# Patient Record
(demographics unavailable — no encounter records)

---

## 2024-12-09 NOTE — HEALTH RISK ASSESSMENT
[Yes] : Yes [4 or more  times a week (4 pts)] : 4 or more  times a week (4 points) [1 or 2 (0 pts)] : 1 or 2 (0 points) [Never (0 pts)] : Never (0 points) [No] : In the past 12 months have you used drugs other than those required for medical reasons? No [One fall no injury in past year] : Patient reported one fall in the past year without injury [0] : 2) Feeling down, depressed, or hopeless: Not at all (0) [PHQ-2 Negative - No further assessment needed] : PHQ-2 Negative - No further assessment needed [Never] : Never [Very Good] : ~his/her~  mood as very good [HIV test declined] : HIV test declined [Hepatitis C test declined] : Hepatitis C test declined [None] : None [Alone] : lives alone [Retired] : retired [Graduate School] : graduate school [Single] : single [# Of Children ___] : has [unfilled] children [Feels Safe at Home] : Feels safe at home [Fully functional (bathing, dressing, toileting, transferring, walking, feeding)] : Fully functional (bathing, dressing, toileting, transferring, walking, feeding) [Fully functional (using the telephone, shopping, preparing meals, housekeeping, doing laundry, using] : Fully functional and needs no help or supervision to perform IADLs (using the telephone, shopping, preparing meals, housekeeping, doing laundry, using transportation, managing medications and managing finances) [Reports changes in vision] : Reports changes in vision [Reports normal functional visual acuity (ie: able to read med bottle)] : Reports normal functional visual acuity [Smoke Detector] : smoke detector [Seat Belt] :  uses seat belt [Reviewed no changes] : Reviewed, no changes [Designated Healthcare Proxy] : Designated healthcare proxy [Name: ___] : Health Care Proxy's Name: [unfilled]  [I will adhere to the patient's wishes.] : I will adhere to the patient's wishes. [Excellent] : ~his/her~ current health as excellent [NO] : No [FreeTextEntry1] : Visual issues- cataract surgery [de-identified] : none [de-identified] : EYe doctor [de-identified] : 2 glasses wine nightly [Audit-CScore] : 4 [de-identified] : gym and walking- - 2x weekly gym [de-identified] : vegetarian [de-identified] : Feb 2024 ago lost balance- fell no injury [facility] [[id number]] : 0 [Change in mental status noted] : No change in mental status noted [Language] : denies difficulty with language [Behavior] : denies difficulty with behavior [Learning/Retaining New Information] : denies difficulty learning/retaining new information [Handling Complex Tasks] : denies difficulty handling complex tasks [Reasoning] : denies difficulty with reasoning [Spatial Ability and Orientation] : denies difficulty with spatial ability and orientation [Sexually Active] : not sexually active [High Risk Behavior] : no high risk behavior [Reports changes in hearing] : Reports no changes in hearing [Reports changes in dental health] : Reports no changes in dental health [de-identified] : Doctorate Educational Administration [facility] [de-identified] : glasses- eye MD this year Dr Romero- upcoming cataracts surgery [de-identified] : No issues zacarias [AdvancecareDate] : 12/24

## 2024-12-09 NOTE — REASON FOR VISIT
[Annual Wellness Visit] : an annual wellness visit [FreeTextEntry1] : and also a preop eval for cataract surgery bilaterally Dr Romero Jan 7th and 21st, and for f/u hP

## 2024-12-09 NOTE — REVIEW OF SYSTEMS
[Patient Intake Form Reviewed] : Patient intake form was reviewed [FreeTextEntry9] : +s/p left hip replacement 1/24 [Vision Problems] : vision problems [Negative] : Musculoskeletal [FreeTextEntry3] : cataracts see HPI

## 2024-12-09 NOTE — PHYSICAL EXAM
[Pedal Pulses Present] : the pedal pulses are present [No Palpable Aorta] : no palpable aorta [No Extremity Clubbing/Cyanosis] : no extremity clubbing/cyanosis [Normal Appearance] : normal in appearance [No Masses] : no palpable masses [No Nipple Discharge] : no nipple discharge [No Axillary Lymphadenopathy] : no axillary lymphadenopathy [Normal Axillary Nodes] : no axillary lymphadenopathy [Normal Inguinal Nodes] : no inguinal lymphadenopathy [Normal] : normal gait, coordination grossly intact, no focal deficits and deep tendon reflexes were 2+ and symmetric [de-identified] : pitting and mostly nonpitting edeam to ankles bilaterally

## 2024-12-09 NOTE — HEALTH RISK ASSESSMENT
[Yes] : Yes [4 or more  times a week (4 pts)] : 4 or more  times a week (4 points) [1 or 2 (0 pts)] : 1 or 2 (0 points) [Never (0 pts)] : Never (0 points) [No] : In the past 12 months have you used drugs other than those required for medical reasons? No [One fall no injury in past year] : Patient reported one fall in the past year without injury [0] : 2) Feeling down, depressed, or hopeless: Not at all (0) [PHQ-2 Negative - No further assessment needed] : PHQ-2 Negative - No further assessment needed [Never] : Never [Very Good] : ~his/her~  mood as very good [HIV test declined] : HIV test declined [Hepatitis C test declined] : Hepatitis C test declined [None] : None [Alone] : lives alone [Retired] : retired [Graduate School] : graduate school [Single] : single [# Of Children ___] : has [unfilled] children [Feels Safe at Home] : Feels safe at home [Fully functional (bathing, dressing, toileting, transferring, walking, feeding)] : Fully functional (bathing, dressing, toileting, transferring, walking, feeding) [Fully functional (using the telephone, shopping, preparing meals, housekeeping, doing laundry, using] : Fully functional and needs no help or supervision to perform IADLs (using the telephone, shopping, preparing meals, housekeeping, doing laundry, using transportation, managing medications and managing finances) [Reports changes in vision] : Reports changes in vision [Reports normal functional visual acuity (ie: able to read med bottle)] : Reports normal functional visual acuity [Smoke Detector] : smoke detector [Seat Belt] :  uses seat belt [Reviewed no changes] : Reviewed, no changes [Designated Healthcare Proxy] : Designated healthcare proxy [Name: ___] : Health Care Proxy's Name: [unfilled]  [I will adhere to the patient's wishes.] : I will adhere to the patient's wishes. [Excellent] : ~his/her~ current health as excellent [NO] : No [FreeTextEntry1] : Visual issues- cataract surgery [de-identified] : none [de-identified] : EYe doctor [de-identified] : 2 glasses wine nightly [Audit-CScore] : 4 [de-identified] : gym and walking- - 2x weekly gym [de-identified] : vegetarian [de-identified] : Feb 2024 ago lost balance- fell no injury [facility] [[id number]] : 0 [Change in mental status noted] : No change in mental status noted [Language] : denies difficulty with language [Behavior] : denies difficulty with behavior [Learning/Retaining New Information] : denies difficulty learning/retaining new information [Handling Complex Tasks] : denies difficulty handling complex tasks [Reasoning] : denies difficulty with reasoning [Spatial Ability and Orientation] : denies difficulty with spatial ability and orientation [Sexually Active] : not sexually active [High Risk Behavior] : no high risk behavior [Reports changes in hearing] : Reports no changes in hearing [Reports changes in dental health] : Reports no changes in dental health [de-identified] : Doctorate Educational Administration [facility] [de-identified] : glasses- eye MD this year Dr Romero- upcoming cataracts surgery [de-identified] : No issues zacarias [AdvancecareDate] : 12/24

## 2024-12-09 NOTE — ASSESSMENT
[FreeTextEntry1] : 1. HBP- goal BP less than 130/80. - MET-  currently on losartan 100 mg daily, amlodipine 5 mg daily, chlorthalidone 25 mg daily - continue   2. Hyperlipidemia- LDL last Aug 2020 98 mod intensity statin- Cardiac Calcium scoring= 0 AU 8/20   3. h/o IFG- Continue to monitor with TLC enc. - No known fam hx of DM, but has other risks (Obesity/HBP, hyperlipidemia). Last  7/20- TLC encouraged- no sx. Issue of metformin discussed in past- declined- no sx of hyperglycemia   4 HCM- immun - reviewed- flu shot this season  mammo March 2024- neg  colon 1/6/17- negative- and neg 12/06- but 3mm tubular adenoma 1/9/02)- will repeat by aged 75  DEXA at age 65 - 8/16 and NORMAL  screen for Hepatitis C - neg 8/12  CBC with + fam hx of CLL- normal last  Depression Screening- negative   5. Hepatic Steatosis/Obesity- Sono with steatosis of liver in past and occasional elevation of transaminases- Wt +/- stable over last 10 years and encouraged- However ideally would weight 10 lbs less- around 170lbs- would take pt out of obese category- - encouraged. Issue discussed- repeat sono May 2015- 5mm GB polyp and hepatic steatosis noted- and again 12/17- Gallstones noted- normal LFT's since-  6. Falls- fall prevention reviewed AT LENGTH. Precautions reviewed  8. edema- better with chlorthalidone  7. Cataracts- no contraindication to planned procedures- will check ecg and send not to opthalmology  f/u 6 mos or prn-- labs and ECG today   12/9- Called pt and reviewed labs/ECG- essentially normal/stable for pt. - Cardiac calcium scoring with AU= 0 in 2020- issues discussed on atorvastatin 20- ? intensify or consider repeating calcium scoring Pt prefers latter  NO CONTRAINDICATIONS TO PLANNED CATARACT SURGERY AND ANESTHESIA PT OPTIMIZED FOR SURGERY TO TAKE ANTIHYPERTENSIVES WITH SIP OF WATER ON AM OF SURGERY

## 2024-12-09 NOTE — REVIEW OF SYSTEMS
Applied [Patient Intake Form Reviewed] : Patient intake form was reviewed [FreeTextEntry9] : +s/p left hip replacement 1/24 [Vision Problems] : vision problems [Negative] : Musculoskeletal [FreeTextEntry3] : cataracts see HPI

## 2024-12-09 NOTE — PHYSICAL EXAM
[Pedal Pulses Present] : the pedal pulses are present [No Palpable Aorta] : no palpable aorta [No Extremity Clubbing/Cyanosis] : no extremity clubbing/cyanosis [Normal Appearance] : normal in appearance [No Masses] : no palpable masses [No Nipple Discharge] : no nipple discharge [No Axillary Lymphadenopathy] : no axillary lymphadenopathy [Normal Axillary Nodes] : no axillary lymphadenopathy [Normal Inguinal Nodes] : no inguinal lymphadenopathy [Normal] : normal gait, coordination grossly intact, no focal deficits and deep tendon reflexes were 2+ and symmetric [de-identified] : pitting and mostly nonpitting edeam to ankles bilaterally

## 2024-12-09 NOTE — HISTORY OF PRESENT ILLNESS
[FreeTextEntry1] : 75 yo for scheduled AWV and pre-op cataract surgery and f/u HBP mgt with change in med last visit [de-identified] : Last seen in March. Patient has been generally well without any significant intercurrent issues or problems. Adherent with medications as noted without side effects. Appetite good and weight reportedly stable. Active with good exercise tolerance. No sx of chest pain, sob, palpitations, orthopnea, PND, VIDES, edema, lightheadedness..  Feels good result from second hip surgery in Jan.  Visual issues and has decided to go forward with cataract surgery  Home BP not quite at goal- changed the HCTZ to chlorthalidone last visit

## 2024-12-09 NOTE — HEALTH RISK ASSESSMENT
[Yes] : Yes [4 or more  times a week (4 pts)] : 4 or more  times a week (4 points) [1 or 2 (0 pts)] : 1 or 2 (0 points) [Never (0 pts)] : Never (0 points) [No] : In the past 12 months have you used drugs other than those required for medical reasons? No [One fall no injury in past year] : Patient reported one fall in the past year without injury [0] : 2) Feeling down, depressed, or hopeless: Not at all (0) [PHQ-2 Negative - No further assessment needed] : PHQ-2 Negative - No further assessment needed [Never] : Never [Very Good] : ~his/her~  mood as very good [HIV test declined] : HIV test declined [Hepatitis C test declined] : Hepatitis C test declined [None] : None [Alone] : lives alone [Retired] : retired [Graduate School] : graduate school [Single] : single [# Of Children ___] : has [unfilled] children [Feels Safe at Home] : Feels safe at home [Fully functional (bathing, dressing, toileting, transferring, walking, feeding)] : Fully functional (bathing, dressing, toileting, transferring, walking, feeding) [Fully functional (using the telephone, shopping, preparing meals, housekeeping, doing laundry, using] : Fully functional and needs no help or supervision to perform IADLs (using the telephone, shopping, preparing meals, housekeeping, doing laundry, using transportation, managing medications and managing finances) [Reports changes in vision] : Reports changes in vision [Reports normal functional visual acuity (ie: able to read med bottle)] : Reports normal functional visual acuity [Smoke Detector] : smoke detector [Seat Belt] :  uses seat belt [Reviewed no changes] : Reviewed, no changes [Designated Healthcare Proxy] : Designated healthcare proxy [Name: ___] : Health Care Proxy's Name: [unfilled]  [I will adhere to the patient's wishes.] : I will adhere to the patient's wishes. [Excellent] : ~his/her~ current health as excellent [NO] : No [FreeTextEntry1] : Visual issues- cataract surgery [de-identified] : none [de-identified] : EYe doctor [de-identified] : 2 glasses wine nightly [Audit-CScore] : 4 [de-identified] : gym and walking- - 2x weekly gym [de-identified] : vegetarian [de-identified] : Feb 2024 ago lost balance- fell no injury [facility] [[id number]] : 0 [Change in mental status noted] : No change in mental status noted [Language] : denies difficulty with language [Behavior] : denies difficulty with behavior [Learning/Retaining New Information] : denies difficulty learning/retaining new information [Handling Complex Tasks] : denies difficulty handling complex tasks [Reasoning] : denies difficulty with reasoning [Spatial Ability and Orientation] : denies difficulty with spatial ability and orientation [Sexually Active] : not sexually active [High Risk Behavior] : no high risk behavior [Reports changes in hearing] : Reports no changes in hearing [Reports changes in dental health] : Reports no changes in dental health [de-identified] : Doctorate Educational Administration [facility] [de-identified] : glasses- eye MD this year Dr Romero- upcoming cataracts surgery [de-identified] : No issues zacarias [AdvancecareDate] : 12/24

## 2024-12-09 NOTE — HISTORY OF PRESENT ILLNESS
[FreeTextEntry1] : 73 yo for scheduled AWV and pre-op cataract surgery and f/u HBP mgt with change in med last visit [de-identified] : Last seen in March. Patient has been generally well without any significant intercurrent issues or problems. Adherent with medications as noted without side effects. Appetite good and weight reportedly stable. Active with good exercise tolerance. No sx of chest pain, sob, palpitations, orthopnea, PND, VIDES, edema, lightheadedness..  Feels good result from second hip surgery in Jan.  Visual issues and has decided to go forward with cataract surgery  Home BP not quite at goal- changed the HCTZ to chlorthalidone last visit

## 2024-12-09 NOTE — HISTORY OF PRESENT ILLNESS
[FreeTextEntry1] : 75 yo for scheduled AWV and pre-op cataract surgery and f/u HBP mgt with change in med last visit [de-identified] : Last seen in March. Patient has been generally well without any significant intercurrent issues or problems. Adherent with medications as noted without side effects. Appetite good and weight reportedly stable. Active with good exercise tolerance. No sx of chest pain, sob, palpitations, orthopnea, PND, VIDES, edema, lightheadedness..  Feels good result from second hip surgery in Jan.  Visual issues and has decided to go forward with cataract surgery  Home BP not quite at goal- changed the HCTZ to chlorthalidone last visit

## 2025-05-13 NOTE — REASON FOR VISIT
[Follow-Up Visit] : a follow-up visit for [Other: ____] : [unfilled] [FreeTextEntry2] : S/P Left robotic-assisted anterior THR with ANAND; DOS: 1/24/24. S/P Right DAA THR; DOS: 12/11/19.

## 2025-05-13 NOTE — DISCUSSION/SUMMARY
[de-identified] :  The patient is doing very well 1.5 years following left total hip replacement. The patient will continue their home exercises.  A course of Mobic was recommended. The patient was given a prescription for the Mobic with directions. She was instructed to stop the medicine and call the office if there are any adverse reaction to the medicine. Overall the patient is very happy with the outcome of the surgery. Dental prophylaxis was reviewed. Follow up in 5 years for radiographic surveillance.

## 2025-05-13 NOTE — HISTORY OF PRESENT ILLNESS
[de-identified] :  BARTOLO SOUZA is a 74 year female presenting 1.5 years s/p left total hip replacement. The patient reports continuing a home exercise routine. The patient has returned to daily activities of life without significant pain or discomfort. Overall, the patient is very happy with the result of the surgery.

## 2025-05-13 NOTE — PHYSICAL EXAM
[de-identified] :  The patient appears well nourished and in no apparent distress. The patient is alert and oriented to person, place, and time. Affect and mood appear normal. The head is normocephalic and atraumatic. The eyes reveal normal sclera and extra ocular muscles are intact. The tongue is midline with no apparent lesions. Skin shows normal turgor with no evidence of eczema or psoriasis. No respiratory distress noted. Sensation grossly intact. [de-identified] :  Exam of the left hip shows a well healed incision, hip flexion of 110 degrees, hip external rotation of 50 degrees, hip internal rotation of 25 degrees.  The patient can perform a straight leg raise with good speed and strength. 5/5 motor strength bilaterally distally. Sensation intact distally [de-identified] :  X-ray: AP of the pelvis and 2 views of the left hip demonstrate a left total hip arthroplasty in stable position, with no evidence of fracture, loosening, or dislocation.

## 2025-06-06 NOTE — HEALTH RISK ASSESSMENT
[de-identified] : none [de-identified] : Eye doctor; - cataracts- ortho [de-identified] : 2 glasses wine nightly [Audit-CScore] : 4 [de-identified] : vegetarian [de-identified] : gym and walking- - 2x weekly gym [de-identified] : Feb 2024 ago lost balance- fell no injury [facility] [[id number]] : 0

## 2025-06-06 NOTE — ASSESSMENT
[FreeTextEntry1] : 1. HBP- goal BP less than 130/80. - MET- currently on losartan 100 mg daily, amlodipine 5 mg daily, chlorthalidone 25 mg daily - continue   2. Hyperlipidemia- LDL last Aug 2020 98 mod intensity statin- Cardiac Calcium scoring= 0 AU 8/20- have discussed repeating vs treating chol- wishes to repeat- order placed   3. h/o IFG- Continue to monitor with TLC enc. - No known fam hx of DM, but has other risks (Obesity/HBP, hyperlipidemia). Last  7/20- TLC encouraged- no sx. Issue of metformin discussed in past- declined- no sx of hyperglycemia   4 HCM- immun - RSV advised now that over aged 75  mammo March 2024- neg- order for 2025  colon 1/6/17- negative- and neg 12/06- but 3mm tubular adenoma 1/9/02)- will repeat by aged 75  DEXA at age 65 - 8/16 and NORMAL  screen for Hepatitis C - neg 8/12  CBC with + fam hx of CLL- normal last  Depression Screening- negative   5. Hepatic Steatosis/Obesity- Sono with steatosis of liver in past and occasional elevation of transaminases- Wt +/- stable over last 10 years and encouraged- However ideally would weight 10 lbs less- around 170lbs- would take pt out of obese category- - encouraged. Issue discussed- repeat sono May 2015- 5mm GB polyp and hepatic steatosis noted- and again 12/17- Gallstones noted- normal LFT's since-  6. Falls- fall prevention reviewed AT LENGTH. Precautions reviewed  8. edema- better with chlorthalidone  f/u 6 mos or prn-- labs and ECG then- order for mammo and Cardiac calcium scoring

## 2025-06-06 NOTE — HEALTH RISK ASSESSMENT
[de-identified] : none [de-identified] : Eye doctor; - cataracts- ortho [de-identified] : 2 glasses wine nightly [Audit-CScore] : 4 [de-identified] : gym and walking- - 2x weekly gym [de-identified] : vegetarian [de-identified] : Feb 2024 ago lost balance- fell no injury [facility] : [facility] [[id number]] : 0

## 2025-06-06 NOTE — HISTORY OF PRESENT ILLNESS
[FreeTextEntry1] : 74 yo for scheduled for visit for falls, hyperlipidemia, IFG and routine care [de-identified] : Last seen 6 mos ago. Patient has been generally well without any significant intercurrent issues or problems. Adherent with medications as noted without side effects. Appetite good and weight reportedly stable. Active with good exercise tolerance. No sx of chest pain, sob, palpitations, orthopnea, PND, VIDES, edema, lightheadedness..  Has been investigating other living situations- does not feel ready to make move.

## 2025-06-06 NOTE — HISTORY OF PRESENT ILLNESS
[FreeTextEntry1] : 74 yo for scheduled for visit for falls, hyperlipidemia, IFG and routine care [de-identified] : Last seen 6 mos ago. Patient has been generally well without any significant intercurrent issues or problems. Adherent with medications as noted without side effects. Appetite good and weight reportedly stable. Active with good exercise tolerance. No sx of chest pain, sob, palpitations, orthopnea, PND, VIDES, edema, lightheadedness..  Has been investigating other living situations- does not feel ready to make move.